# Patient Record
Sex: MALE
[De-identification: names, ages, dates, MRNs, and addresses within clinical notes are randomized per-mention and may not be internally consistent; named-entity substitution may affect disease eponyms.]

---

## 2020-06-15 ENCOUNTER — HOSPITAL ENCOUNTER (OUTPATIENT)
Dept: HOSPITAL 90 - EDH | Age: 75
Setting detail: OBSERVATION
LOS: 2 days | Discharge: LEFT BEFORE DISCHARGE | End: 2020-06-17
Attending: INTERNAL MEDICINE | Admitting: INTERNAL MEDICINE
Payer: COMMERCIAL

## 2020-06-15 VITALS — DIASTOLIC BLOOD PRESSURE: 63 MMHG | SYSTOLIC BLOOD PRESSURE: 141 MMHG

## 2020-06-15 VITALS — DIASTOLIC BLOOD PRESSURE: 49 MMHG | SYSTOLIC BLOOD PRESSURE: 130 MMHG

## 2020-06-15 VITALS — BODY MASS INDEX: 29.55 KG/M2 | HEIGHT: 68 IN | WEIGHT: 195 LBS

## 2020-06-15 VITALS — SYSTOLIC BLOOD PRESSURE: 142 MMHG | DIASTOLIC BLOOD PRESSURE: 62 MMHG

## 2020-06-15 VITALS — DIASTOLIC BLOOD PRESSURE: 66 MMHG | SYSTOLIC BLOOD PRESSURE: 138 MMHG

## 2020-06-15 VITALS — SYSTOLIC BLOOD PRESSURE: 139 MMHG | DIASTOLIC BLOOD PRESSURE: 55 MMHG

## 2020-06-15 VITALS — DIASTOLIC BLOOD PRESSURE: 67 MMHG | SYSTOLIC BLOOD PRESSURE: 140 MMHG

## 2020-06-15 VITALS — SYSTOLIC BLOOD PRESSURE: 142 MMHG | DIASTOLIC BLOOD PRESSURE: 56 MMHG

## 2020-06-15 VITALS — SYSTOLIC BLOOD PRESSURE: 111 MMHG | DIASTOLIC BLOOD PRESSURE: 68 MMHG

## 2020-06-15 VITALS — DIASTOLIC BLOOD PRESSURE: 57 MMHG | SYSTOLIC BLOOD PRESSURE: 135 MMHG

## 2020-06-15 VITALS — SYSTOLIC BLOOD PRESSURE: 138 MMHG | DIASTOLIC BLOOD PRESSURE: 66 MMHG

## 2020-06-15 VITALS — SYSTOLIC BLOOD PRESSURE: 138 MMHG | DIASTOLIC BLOOD PRESSURE: 64 MMHG

## 2020-06-15 VITALS — SYSTOLIC BLOOD PRESSURE: 143 MMHG | DIASTOLIC BLOOD PRESSURE: 59 MMHG

## 2020-06-15 VITALS — DIASTOLIC BLOOD PRESSURE: 90 MMHG | SYSTOLIC BLOOD PRESSURE: 111 MMHG

## 2020-06-15 VITALS — SYSTOLIC BLOOD PRESSURE: 136 MMHG | DIASTOLIC BLOOD PRESSURE: 57 MMHG

## 2020-06-15 DIAGNOSIS — E78.5: ICD-10-CM

## 2020-06-15 DIAGNOSIS — Z79.4: ICD-10-CM

## 2020-06-15 DIAGNOSIS — E11.9: ICD-10-CM

## 2020-06-15 DIAGNOSIS — I71.4: ICD-10-CM

## 2020-06-15 DIAGNOSIS — N45.2: ICD-10-CM

## 2020-06-15 DIAGNOSIS — R33.8: ICD-10-CM

## 2020-06-15 DIAGNOSIS — I10: ICD-10-CM

## 2020-06-15 DIAGNOSIS — Z87.891: ICD-10-CM

## 2020-06-15 DIAGNOSIS — N45.3: Primary | ICD-10-CM

## 2020-06-15 DIAGNOSIS — N40.1: ICD-10-CM

## 2020-06-15 DIAGNOSIS — N50.89: ICD-10-CM

## 2020-06-15 LAB
ALBUMIN SERPL-MCNC: 3.4 G/DL (ref 3.5–5)
ALT SERPL-CCNC: 27 U/L (ref 12–78)
APPEARANCE UR: (no result)
AST SERPL-CCNC: 20 U/L (ref 10–37)
BASOPHILS NFR BLD AUTO: 0.6 % (ref 0–5)
BILIRUB SERPL-MCNC: 0.4 MG/DL (ref 0.2–1)
BILIRUB UR QL STRIP: (no result)
BUN SERPL-MCNC: 13 MG/DL (ref 7–18)
CHLORIDE SERPL-SCNC: 103 MMOL/L (ref 101–111)
CO2 SERPL-SCNC: 27 MMOL/L (ref 21–32)
COLOR UR: YELLOW
CREAT SERPL-MCNC: 1.2 MG/DL (ref 0.5–1.5)
EOSINOPHIL NFR BLD AUTO: 5.3 % (ref 0–8)
ERYTHROCYTE [DISTWIDTH] IN BLOOD BY AUTOMATED COUNT: 13 % (ref 11–15.5)
GFR SERPL CREATININE-BSD FRML MDRD: 63 ML/MIN (ref 60–?)
GLUCOSE SERPL-MCNC: 216 MG/DL (ref 70–105)
GLUCOSE UR STRIP-MCNC: 500 MG/DL
HCT VFR BLD AUTO: 41.9 % (ref 42–54)
HGB UR QL STRIP: (no result)
KETONES UR STRIP-MCNC: 5 MG/DL
LEUKOCYTE ESTERASE UR QL STRIP: (no result)
LYMPHOCYTES NFR SPEC AUTO: 32.7 % (ref 21–51)
MCH RBC QN AUTO: 32 PG (ref 27–33)
MCHC RBC AUTO-ENTMCNC: 33.4 G/DL (ref 32–36)
MCV RBC AUTO: 95.7 FL (ref 79–99)
MONOCYTES NFR BLD AUTO: 6.7 % (ref 3–13)
NEUTROPHILS NFR BLD AUTO: 54.3 % (ref 40–77)
NITRITE UR QL STRIP: NEGATIVE
NRBC BLD MANUAL-RTO: 0 % (ref 0–0.19)
PH UR STRIP: 6 [PH] (ref 5–8)
PLATELET # BLD AUTO: 217 K/UL (ref 130–400)
POTASSIUM SERPL-SCNC: 3.7 MMOL/L (ref 3.5–5.1)
PROT SERPL-MCNC: 7 G/DL (ref 6–8.3)
PROT UR QL STRIP: 30 MG/DL
RBC # BLD AUTO: 4.38 MIL/UL (ref 4.5–6.2)
RBC #/AREA URNS HPF: (no result) /HPF (ref 0–1)
SODIUM SERPL-SCNC: 137 MMOL/L (ref 136–145)
SP GR UR STRIP: 1.03 (ref 1–1.03)
UROBILINOGEN UR STRIP-MCNC: 1 MG/DL (ref 0.2–1)
WBC # BLD AUTO: 8.2 K/UL (ref 4.8–10.8)
WBC #/AREA URNS HPF: (no result) /HPF (ref 0–1)

## 2020-06-15 PROCEDURE — 88307 TISSUE EXAM BY PATHOLOGIST: CPT

## 2020-06-15 PROCEDURE — 76870 US EXAM SCROTUM: CPT

## 2020-06-15 PROCEDURE — 83735 ASSAY OF MAGNESIUM: CPT

## 2020-06-15 PROCEDURE — 80048 BASIC METABOLIC PNL TOTAL CA: CPT

## 2020-06-15 PROCEDURE — 87076 CULTURE ANAEROBE IDENT EACH: CPT

## 2020-06-15 PROCEDURE — 54520 REMOVAL OF TESTIS: CPT

## 2020-06-15 PROCEDURE — 87205 SMEAR GRAM STAIN: CPT

## 2020-06-15 PROCEDURE — 87088 URINE BACTERIA CULTURE: CPT

## 2020-06-15 PROCEDURE — 81001 URINALYSIS AUTO W/SCOPE: CPT

## 2020-06-15 PROCEDURE — 85025 COMPLETE CBC W/AUTO DIFF WBC: CPT

## 2020-06-15 PROCEDURE — 84484 ASSAY OF TROPONIN QUANT: CPT

## 2020-06-15 PROCEDURE — 87040 BLOOD CULTURE FOR BACTERIA: CPT

## 2020-06-15 PROCEDURE — 82948 REAGENT STRIP/BLOOD GLUCOSE: CPT

## 2020-06-15 PROCEDURE — 96365 THER/PROPH/DIAG IV INF INIT: CPT

## 2020-06-15 PROCEDURE — 83605 ASSAY OF LACTIC ACID: CPT

## 2020-06-15 PROCEDURE — 36415 COLL VENOUS BLD VENIPUNCTURE: CPT

## 2020-06-15 PROCEDURE — 96366 THER/PROPH/DIAG IV INF ADDON: CPT

## 2020-06-15 PROCEDURE — 93005 ELECTROCARDIOGRAM TRACING: CPT

## 2020-06-15 PROCEDURE — 76775 US EXAM ABDO BACK WALL LIM: CPT

## 2020-06-15 PROCEDURE — 99285 EMERGENCY DEPT VISIT HI MDM: CPT

## 2020-06-15 PROCEDURE — 80053 COMPREHEN METABOLIC PANEL: CPT

## 2020-06-15 PROCEDURE — 87070 CULTURE OTHR SPECIMN AEROBIC: CPT

## 2020-06-15 PROCEDURE — 82550 ASSAY OF CK (CPK): CPT

## 2020-06-15 PROCEDURE — 96372 THER/PROPH/DIAG INJ SC/IM: CPT

## 2020-06-15 PROCEDURE — 85027 COMPLETE CBC AUTOMATED: CPT

## 2020-06-15 RX ADMIN — SODIUM CHLORIDE SCH MLS/HR: 0.9 INJECTION, SOLUTION INTRAVENOUS at 20:30

## 2020-06-15 NOTE — NUR
PT ARRIVED TO THE FLOOR 

VITAL SIGNS STABLE 

NO SIGNS OF BLEEDING AT SURGICAL SITE NOTED 

BLADER SCAN PATIENT, VOLUME  AS PER BLADDER SCANNER 

PT WITH SOME NUMBNESS ON LOWER EXTREMITIES 

PT DENIES SOB , DENIES CHEST PAIN

## 2020-06-16 VITALS — DIASTOLIC BLOOD PRESSURE: 62 MMHG | SYSTOLIC BLOOD PRESSURE: 116 MMHG

## 2020-06-16 VITALS — DIASTOLIC BLOOD PRESSURE: 69 MMHG | SYSTOLIC BLOOD PRESSURE: 134 MMHG

## 2020-06-16 VITALS — SYSTOLIC BLOOD PRESSURE: 142 MMHG | DIASTOLIC BLOOD PRESSURE: 63 MMHG

## 2020-06-16 VITALS — SYSTOLIC BLOOD PRESSURE: 125 MMHG | DIASTOLIC BLOOD PRESSURE: 59 MMHG

## 2020-06-16 VITALS — DIASTOLIC BLOOD PRESSURE: 67 MMHG | SYSTOLIC BLOOD PRESSURE: 139 MMHG

## 2020-06-16 VITALS — SYSTOLIC BLOOD PRESSURE: 147 MMHG | DIASTOLIC BLOOD PRESSURE: 72 MMHG

## 2020-06-16 VITALS — SYSTOLIC BLOOD PRESSURE: 140 MMHG | DIASTOLIC BLOOD PRESSURE: 59 MMHG

## 2020-06-16 VITALS — DIASTOLIC BLOOD PRESSURE: 60 MMHG | SYSTOLIC BLOOD PRESSURE: 124 MMHG

## 2020-06-16 VITALS — SYSTOLIC BLOOD PRESSURE: 121 MMHG | DIASTOLIC BLOOD PRESSURE: 60 MMHG

## 2020-06-16 VITALS — DIASTOLIC BLOOD PRESSURE: 70 MMHG | SYSTOLIC BLOOD PRESSURE: 147 MMHG

## 2020-06-16 VITALS — SYSTOLIC BLOOD PRESSURE: 122 MMHG | DIASTOLIC BLOOD PRESSURE: 63 MMHG

## 2020-06-16 VITALS — DIASTOLIC BLOOD PRESSURE: 70 MMHG | SYSTOLIC BLOOD PRESSURE: 127 MMHG

## 2020-06-16 LAB
BUN SERPL-MCNC: 9 MG/DL (ref 7–18)
CHLORIDE SERPL-SCNC: 107 MMOL/L (ref 101–111)
CO2 SERPL-SCNC: 27 MMOL/L (ref 21–32)
CREAT SERPL-MCNC: 0.9 MG/DL (ref 0.5–1.5)
ERYTHROCYTE [DISTWIDTH] IN BLOOD BY AUTOMATED COUNT: 12.8 % (ref 11–15.5)
GFR SERPL CREATININE-BSD FRML MDRD: 87 ML/MIN (ref 60–?)
GLUCOSE SERPL-MCNC: 116 MG/DL (ref 70–105)
HCT VFR BLD AUTO: 37.7 % (ref 42–54)
MCH RBC QN AUTO: 31.1 PG (ref 27–33)
MCHC RBC AUTO-ENTMCNC: 32.9 G/DL (ref 32–36)
MCV RBC AUTO: 94.5 FL (ref 79–99)
NRBC BLD MANUAL-RTO: 0 % (ref 0–0.19)
PLATELET # BLD AUTO: 170 K/UL (ref 130–400)
POTASSIUM SERPL-SCNC: 3.5 MMOL/L (ref 3.5–5.1)
RBC # BLD AUTO: 3.99 MIL/UL (ref 4.5–6.2)
SODIUM SERPL-SCNC: 140 MMOL/L (ref 136–145)
WBC # BLD AUTO: 8.6 K/UL (ref 4.8–10.8)

## 2020-06-16 RX ADMIN — SODIUM CHLORIDE SCH MLS/HR: 0.9 INJECTION, SOLUTION INTRAVENOUS at 09:50

## 2020-06-16 RX ADMIN — SODIUM CHLORIDE SCH UNIT: 9 INJECTION, SOLUTION INTRAVENOUS at 16:30

## 2020-06-16 RX ADMIN — PIPERACILLIN SODIUM AND TAZOBACTAM SODIUM SCH MLS/HR: .375; 3 INJECTION, POWDER, LYOPHILIZED, FOR SOLUTION INTRAVENOUS at 19:06

## 2020-06-16 RX ADMIN — PIPERACILLIN SODIUM AND TAZOBACTAM SODIUM SCH MLS/HR: .375; 3 INJECTION, POWDER, LYOPHILIZED, FOR SOLUTION INTRAVENOUS at 21:00

## 2020-06-16 RX ADMIN — SODIUM CHLORIDE SCH UNIT: 9 INJECTION, SOLUTION INTRAVENOUS at 12:38

## 2020-06-16 RX ADMIN — POTASSIUM CHLORIDE PRN MEQ: 1500 TABLET, EXTENDED RELEASE ORAL at 17:09

## 2020-06-16 RX ADMIN — SODIUM CHLORIDE SCH UNIT: 9 INJECTION, SOLUTION INTRAVENOUS at 22:56

## 2020-06-16 RX ADMIN — POTASSIUM CHLORIDE PRN MEQ: 1500 TABLET, EXTENDED RELEASE ORAL at 12:47

## 2020-06-16 RX ADMIN — SODIUM CHLORIDE SCH MLS/HR: 0.9 INJECTION, SOLUTION INTRAVENOUS at 23:10

## 2020-06-16 RX ADMIN — FAMOTIDINE SCH MG: 20 TABLET, FILM COATED ORAL at 21:00

## 2020-06-16 NOTE — NUR
CM NOTE/IA

MEET WITH PATIENT IN ROOM. AS PER PATIENT, LIVES WITH FRIEND, INDEPENDENT WITH ADLS, NO DME 
IN USE, HAS PROVIDER 3HR PER DAY, AND FEELS SAFE TO RETURN HOME ONCE DISCHARGED. PERSON TO 
CALL FOR RIDE HOME IS CAREGIVER, GEORGE MCDANIELS [phone], NURSING MADE AWARE. 

-------------------------------------------------------------------------------

Addendum: 06/18/20 at 1518 by CANDELARIA DUEÑAS RN CM

-------------------------------------------------------------------------------

Amended: Links added.

## 2020-06-16 NOTE — NUR
Patient called complaining that his IV site is leaking, attempted to fix it. Patient stated 
he wanted it out, informed patient that the IV site was required due to the antibiotic he is 
receiving. Patient stated he did not want it and did not want it restarted. Patient signed a 
refusal of treatment. Saline lock removed with catheter intact.

## 2020-06-16 NOTE — NUR
WEI CATHETER INSERTED 

PT TOLERATED PROCEDURE WELL

DRESSING FROM SURGERY CHANGED 

SMALL AMOUNT OF BLEEDIG NOTED, HOWEVER 

HEMOSTATIS NOTED

## 2020-06-17 VITALS — SYSTOLIC BLOOD PRESSURE: 123 MMHG | DIASTOLIC BLOOD PRESSURE: 69 MMHG

## 2020-06-17 VITALS — SYSTOLIC BLOOD PRESSURE: 128 MMHG | DIASTOLIC BLOOD PRESSURE: 71 MMHG

## 2020-06-17 VITALS — SYSTOLIC BLOOD PRESSURE: 142 MMHG | DIASTOLIC BLOOD PRESSURE: 85 MMHG

## 2020-06-17 LAB
BUN SERPL-MCNC: 12 MG/DL (ref 7–18)
CHLORIDE SERPL-SCNC: 106 MMOL/L (ref 101–111)
CO2 SERPL-SCNC: 28 MMOL/L (ref 21–32)
CREAT SERPL-MCNC: 0.9 MG/DL (ref 0.5–1.5)
GFR SERPL CREATININE-BSD FRML MDRD: 87 ML/MIN (ref 60–?)
GLUCOSE SERPL-MCNC: 139 MG/DL (ref 70–105)
MAGNESIUM SERPL-MCNC: 2 MG/DL (ref 1.8–2.4)
POTASSIUM SERPL-SCNC: 4 MMOL/L (ref 3.5–5.1)
SODIUM SERPL-SCNC: 141 MMOL/L (ref 136–145)

## 2020-06-17 RX ADMIN — SODIUM CHLORIDE SCH UNIT: 9 INJECTION, SOLUTION INTRAVENOUS at 11:30

## 2020-06-17 RX ADMIN — PIPERACILLIN SODIUM AND TAZOBACTAM SODIUM SCH MLS/HR: .375; 3 INJECTION, POWDER, LYOPHILIZED, FOR SOLUTION INTRAVENOUS at 05:00

## 2020-06-17 RX ADMIN — FAMOTIDINE SCH MG: 20 TABLET, FILM COATED ORAL at 10:03

## 2020-06-17 RX ADMIN — PIPERACILLIN SODIUM AND TAZOBACTAM SODIUM SCH MLS/HR: .375; 3 INJECTION, POWDER, LYOPHILIZED, FOR SOLUTION INTRAVENOUS at 13:00

## 2020-06-17 RX ADMIN — SODIUM CHLORIDE SCH UNIT: 9 INJECTION, SOLUTION INTRAVENOUS at 07:05

## 2020-06-17 NOTE — NUR
AMA

pt anxious ,requesting to go home, pt iv out since last pm ,refused to have iv restarted .

1315 pt decided he was leaving AMA explained to pt how important it was for him to stay ,pt 
stated I am leaving I have better things to do than lie in bed for the rest of the day and 
wait on doctors .pt signed AMA form ,walked pt to ER entrance 

-------------------------------------------------------------------------------

Addendum: 06/17/20 at 2013 by LOW HENRY RN RN

-------------------------------------------------------------------------------

Amended: Links added.

## 2020-06-17 NOTE — NUR
NOTIFIED DR. BRITO AND AINSLEY BABIN

REGARDING PT WANTING TO LEAVE AMA. DR. BRITO SAID IF HE WANTED TO LEAVE AMA TO GIVE HIM THE 
FORM TO SIGN. PT DECIDED HE WAS LEAVING AND SIGNED AMA FORM. MARVIN, MADE AWARE, 
VERBALIZED UNDERSTANDING.

-------------------------------------------------------------------------------

Addendum: 06/17/20 at 1409 by JEFFERY JIMENEZ RN

-------------------------------------------------------------------------------

(CONTINUED - FROM GIVING HIM FORM TO SIGN AND ALSO, DR. BRITO INSTRUCTED TO INFORM PT OF 
FOLLOWING UP WITH HIS CARDIOLOGIST. PT WAS NOTIFIED OF FOLLOWING UP WITH CARDIOLOGIST DUE TO 
AAA AND PT SAID HES HAD IT FOR A LONG TIME ALREADY & THAT IF HE DIES HE DIES.

## 2025-02-04 ENCOUNTER — HOSPITAL ENCOUNTER (OUTPATIENT)
Dept: HOSPITAL 90 - RAH | Age: 80
Discharge: HOME | End: 2025-02-04
Attending: INTERNAL MEDICINE
Payer: COMMERCIAL

## 2025-02-04 DIAGNOSIS — N28.1: ICD-10-CM

## 2025-02-04 DIAGNOSIS — N40.0: ICD-10-CM

## 2025-02-04 DIAGNOSIS — K80.20: ICD-10-CM

## 2025-02-04 DIAGNOSIS — R31.9: ICD-10-CM

## 2025-02-04 DIAGNOSIS — I71.43: Primary | ICD-10-CM

## 2025-02-04 PROCEDURE — 76770 US EXAM ABDO BACK WALL COMP: CPT

## 2025-02-04 PROCEDURE — 74178 CT ABD&PLV WO CNTR FLWD CNTR: CPT

## 2025-02-04 NOTE — [facility]
Exam Type: CT UROGRAM (ABD/PEL WWO)



Clinical Information: HEMATURIA



Comparison: None



Contrast: 100 cc's Isovue 370 IV, no complications or adverse reactions





CT Dose Index (CTDI): 31.60 mGy

Dose Length Product (DLP): 1740.80 total mGy-cm



Findings:





Large fusiform infrarenal abdominal aortic aneurysm is seen measuring 16

cm in length by 9.2 cm in anteroposterior diameter by 8.1 cm in

transverse diameter.  The aneurysm is patent and there is no dissection

or rupture or occlusion.  There is no extravasation.

No evidence of nephro or ureterolithiasis is found. No hydronephrosis or

ureteral dilatation is seen. 



The lung bases are clear. 



The stomach is unremarkable. It shows no wall thickening. No gross

ulceration is seen. It is not overly distended. There are no surrounding

inflammatory changes. No wall lesions are identified to suggest cancer.



The spleen is unremarkable. It is not enlarged.



The pancreas shows normal anatomy. It is not fatty replaced. It shows no

lesions. The pancreatic duct is not dilated.



There is evidence of cholelithiasis. No evidence of acute or chronic

inflammation is seen.



The adrenal glands are unremarkable. There is no enlargement. No lesions

are noted. 



The liver is unremarkable. It shows no focal masses. 



The appendix is unremarkable. It shows no evidence of inflammation. No

appendicolith is seen.



The small bowel is unremarkable. There is no evidence of dilatation to

suggest obstruction. No evidence of adynamic ileus is seen. There is no

small bowel wall thickening to suggest enteritis.



The colon is unremarkable.



The urinary bladder is unremarkable. There is no wall thickening to

suggest tumor or inflammation. There are no intraluminal calculi. There

are no diverticula. There is no evidence of chronic bladder outlet

obstruction. There is no evidence of urinary bladder distention to

suggest urinary retention.



The prostate is enlarged.



The bony and vascular structures are unremarkable for the patient's age.



     

     

IMPRESSION:

     

Large infrarenal abdominal aortic aneurysm.  Enlarged prostate.



Normal urinary tract otherwise.



This study was performed using dose reduction techniques to include

automated exposure control and/or adjustment of the mA and/or kV

according to patient size.

## 2025-02-04 NOTE — [facility]
Exam Type: US RENAL SONOGRAM



Clinical Information: Hematuria



Comparison: None



Findings:



Examination shows normal renal size and echogenicity bilaterally. 



Preserved cortical thickness and corticomedullary junction region is

seen. 



No hydronephrosis or calculi are seen.



No renal masses are seen. There is no evidence of perinephric fluid on

either side. 



No evidence of significant ureteral dilatation is seen. 



The right kidney measures 10.5 x 5 cm. The left kidney measures 11.5 x

5.9 cm.

Simple cyst right kidney.

The urinary bladder is normal. No bladder masses, stones, or wall

thickening is seen.



Prostate is enlarged.





IMPRESSION: 



Right renal simple cyst. Enlarged prostate.